# Patient Record
(demographics unavailable — no encounter records)

---

## 2024-11-04 NOTE — HISTORY OF PRESENT ILLNESS
[FreeTextEntry1] : NPA/ Establish care [de-identified] : Pt is 43 yo male with PMH of renal stones, genital warts, presents for annual exam and establish care. Last annual exam was more than 1 year ago, previous PCP in [place]. Pt moved to  1 year ago. 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 Pt now has to go frequently to the city to continue business and cause him also to stress with long driving. Pt has been with insomnia since all the personal issues, causing increase in stress. He has taken OTC magnesium which somehow helps.  Pt has had multiple episodes of renal stones requiring hospital admissions and interventions. Last colic was about 2 years ago, and he restarted urology f/u last month. Pending workup.  Also seen by urology due to genital warts/HPV lesions. He has been treated twice and his wife as well due to abnormal PAP.  Urology recommended to evaluated for HPV vaccine and his wife is interested in him to receive it.  Reports as well itchy lesions in the scalp. Pt was previously with topical minoxidil for 3 months with good response and now reports 1 month with scalp lesions that are tender, itchy and associated with scalp desquamation.  Pt reports low back pain for years and recently plantar discomfort and pain in lateral aspect of right foot.

## 2024-11-04 NOTE — ASSESSMENT
[FreeTextEntry1] : CPE HPV vaccine given as above.  Recommendations given for exercise and better sleeping.  Labs ordered.

## 2024-11-04 NOTE — PHYSICAL EXAM
[No Acute Distress] : no acute distress [Well Nourished] : well nourished [Well Developed] : well developed [Well-Appearing] : well-appearing [Normal Sclera/Conjunctiva] : normal sclera/conjunctiva [PERRL] : pupils equal round and reactive to light [EOMI] : extraocular movements intact [Normal Outer Ear/Nose] : the outer ears and nose were normal in appearance [Normal Oropharynx] : the oropharynx was normal [Normal TMs] : both tympanic membranes were normal [No JVD] : no jugular venous distention [No Lymphadenopathy] : no lymphadenopathy [Supple] : supple [Thyroid Normal, No Nodules] : the thyroid was normal and there were no nodules present [No Respiratory Distress] : no respiratory distress  [No Accessory Muscle Use] : no accessory muscle use [Clear to Auscultation] : lungs were clear to auscultation bilaterally [Normal Rate] : normal rate  [Regular Rhythm] : with a regular rhythm [Normal S1, S2] : normal S1 and S2 [No Murmur] : no murmur heard [No Varicosities] : no varicosities [No Edema] : there was no peripheral edema [No Extremity Clubbing/Cyanosis] : no extremity clubbing/cyanosis [Soft] : abdomen soft [Non Tender] : non-tender [Non-distended] : non-distended [No Masses] : no abdominal mass palpated [No HSM] : no HSM [Normal Bowel Sounds] : normal bowel sounds [Normal Supraclavicular Nodes] : no supraclavicular lymphadenopathy [Normal Posterior Cervical Nodes] : no posterior cervical lymphadenopathy [Normal Anterior Cervical Nodes] : no anterior cervical lymphadenopathy [No CVA Tenderness] : no CVA  tenderness [No Joint Swelling] : no joint swelling [Grossly Normal Strength/Tone] : grossly normal strength/tone [Coordination Grossly Intact] : coordination grossly intact [No Focal Deficits] : no focal deficits [Normal Gait] : normal gait [Deep Tendon Reflexes (DTR)] : deep tendon reflexes were 2+ and symmetric [Normal Affect] : the affect was normal [Alert and Oriented x3] : oriented to person, place, and time [Normal Insight/Judgement] : insight and judgment were intact [de-identified] : flat foot [de-identified] : scalp with withish diffuse scaly lesions, few papules in the same areas.

## 2024-11-04 NOTE — HEALTH RISK ASSESSMENT
[Yes] : Yes [Monthly or less (1 pt)] : Monthly or less (1 point) [1 or 2 (0 pts)] : 1 or 2 (0 points) [Never (0 pts)] : Never (0 points) [1] : 2) Feeling down, depressed, or hopeless for several days (1) [None] : None [With Family] : lives with family [# of Members in Household ___] :  household currently consist of [unfilled] member(s) [Employed] : employed [] :  [Never] : Never [# Of Children ___] : has [unfilled] children [Audit-CScore] : 1 [de-identified] : no exercising but active.  [de-identified] : fruits, grains, animal protein, carbs. Sodas. Occasionlal dairy, No coffee. 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  [FreeTextEntry3] : wife is in [place].  [de-identified] : [place] outside the country

## 2024-11-05 NOTE — HISTORY OF PRESENT ILLNESS
[FreeTextEntry1] : 42M w no PMH presents for nephrolithiasis and genital warts.  ~2006, first stone episode. Since then, he has required URS/LL every 2 years.  4/4/16, stone analysis showed 90% Ca-oxalate, 10% CaPO4.  Late-2023, began topical Podofilox for foreskin wart. This made the wart go away. His wife subsequently had cervical lesions related to HPV that required treatment.  8/27/24, PVR 0. started podofilox for foreskin wart, and it resolved.  11/4/24, CMP WNL. PTH 37. CHICO showed b/l renal stones (<9 mm).  Today, he is doing well overall.

## 2024-11-05 NOTE — ASSESSMENT
[FreeTextEntry1] : 42M w no PMH presents for nephrolithiasis and genital warts. Genital warts resolved w podofilox. He has b/l subcentimeter renal stones, and he is a recurrent stone former. We had a long discussion about management options, and he would like to undergo surgical removal of all his stones and focus on stone prevention.  -CT renal stone hunt -24-hr urine -Refer to [facility]

## 2024-11-20 NOTE — HISTORY OF PRESENT ILLNESS
[FreeTextEntry1] : 43 yo M for follow up with nephrolithiasis recently saw Dr. Lowery - reviewed notes  interview performed with [place]bek interpretation  known history of nephrolithiasis previous stones Calcium Oxalate last surgery around 2 years ago reviewed parathyroid hormone normal 11/4/2024 reviewed normal serum calcium levels 11/2024 send 24 hr urine collection only recently - still no report  reviewed last CT 11/11/2024: multiple bilateral stones, non obstructing, more on the right, up to 7-8 mm in the lower poles  discussed options ESWL has best success with smaller, less dense stones, with short skin to stone distance, and with favorable location of the stone within the system. URS is more a successful treatment with multiple stones, more dense stones, challenging body habitus, or stone location. PCNL is best for larger, more dense and complex stones, particularly those involving the lower pole.  will plan for bilateral ureteroscopy  plan: - future review of results of 24 hr urine - bilateral URS + CVAC

## 2025-02-28 NOTE — PHYSICAL EXAM
[No Acute Distress] : no acute distress [Well Nourished] : well nourished [Well-Appearing] : well-appearing [Normal Sclera/Conjunctiva] : normal sclera/conjunctiva [PERRL] : pupils equal round and reactive to light [EOMI] : extraocular movements intact [Normal Oropharynx] : the oropharynx was normal [No Lymphadenopathy] : no lymphadenopathy [Supple] : supple [No Respiratory Distress] : no respiratory distress  [Clear to Auscultation] : lungs were clear to auscultation bilaterally [Normal Rate] : normal rate  [Regular Rhythm] : with a regular rhythm [Normal S1, S2] : normal S1 and S2 [No Murmur] : no murmur heard [No Varicosities] : no varicosities [No Edema] : there was no peripheral edema [No Extremity Clubbing/Cyanosis] : no extremity clubbing/cyanosis [Soft] : abdomen soft [Non Tender] : non-tender [Non-distended] : non-distended [No Masses] : no abdominal mass palpated [Normal Bowel Sounds] : normal bowel sounds [Normal Supraclavicular Nodes] : no supraclavicular lymphadenopathy [Normal Posterior Cervical Nodes] : no posterior cervical lymphadenopathy [Normal Anterior Cervical Nodes] : no anterior cervical lymphadenopathy [No CVA Tenderness] : no CVA  tenderness [Grossly Normal Strength/Tone] : grossly normal strength/tone [Coordination Grossly Intact] : coordination grossly intact [No Focal Deficits] : no focal deficits [Normal Gait] : normal gait [Deep Tendon Reflexes (DTR)] : deep tendon reflexes were 2+ and symmetric [Normal Affect] : the affect was normal [Alert and Oriented x3] : oriented to person, place, and time [Normal Insight/Judgement] : insight and judgment were intact [Normal Mood] : the mood was normal [de-identified] : flat foot [de-identified] : scalp with very mild scaly lesions, no focal alopecia, no hair falling during exam.

## 2025-02-28 NOTE — HISTORY OF PRESENT ILLNESS
[FreeTextEntry1] : Follow up [de-identified] : Pt is 41 yo male with PMH of renal stones, genital warts, seborrheic dermatitis, who presents for follow up and vaccine.  Pt patient reports significant improvement of seborrheic dermatitis with ketoconazole shampoo.  Persist itchiness mostly in frontal parietal area.  Patient has noticed also hair loss, global, with no lesions in the scalp.  He started OTC supplement for hair loss.  But I will review it with Biotin, iron and other vitamins.  Personal issues are resolving, and insomnia has improved.  Patient is sleeping significantly better.  Continues taking magnesium.  Patient is waiting for urological procedure for kidney stones. No results seen for 24-hour urine. For the past month patient has presented episodes of mild pain that resolved with home management.  Patient has not required new ER visits.   Due for second dose of HPV vaccine.

## 2025-06-27 NOTE — PHYSICAL EXAM
[No Acute Distress] : no acute distress [Well-Appearing] : well-appearing [Normal Sclera/Conjunctiva] : normal sclera/conjunctiva [Normal Oropharynx] : the oropharynx was normal [Supple] : supple [No Respiratory Distress] : no respiratory distress  [Clear to Auscultation] : lungs were clear to auscultation bilaterally [Normal Rate] : normal rate  [Regular Rhythm] : with a regular rhythm [Normal S1, S2] : normal S1 and S2 [No Murmur] : no murmur heard [No Edema] : there was no peripheral edema [Soft] : abdomen soft [Non Tender] : non-tender [No Focal Deficits] : no focal deficits [Normal Gait] : normal gait [Normal Affect] : the affect was normal [Alert and Oriented x3] : oriented to person, place, and time [Normal Mood] : the mood was normal [Normal Insight/Judgement] : insight and judgment were intact [No CVA Tenderness] : no CVA  tenderness [de-identified] : flat foot

## 2025-06-27 NOTE — HISTORY OF PRESENT ILLNESS
[de-identified] : Pt is 43 yo male with PMH of renal stones, genital warts, seborrheic dermatitis, who presents for follow up and vaccine.  Pt patient reports significant improvement of seborrheic dermatitis with ketoconazole shampoo and doing well with regrowth of new hair on  OTC supplement and vitamin plus resolution of a stressful factors. About a month ago patient presented an episode of renal colic that lasted about 5 minutes with severe pain and he passed 2 stones.  He has not seen the urologist and did not have procedure. He will make appointment soon for follow-up. Due for last dose of HPV vaccine. No new warts or similar lesions.